# Patient Record
Sex: FEMALE | Race: WHITE | NOT HISPANIC OR LATINO | Employment: UNEMPLOYED | URBAN - METROPOLITAN AREA
[De-identification: names, ages, dates, MRNs, and addresses within clinical notes are randomized per-mention and may not be internally consistent; named-entity substitution may affect disease eponyms.]

---

## 2018-01-21 ENCOUNTER — HOSPITAL ENCOUNTER (EMERGENCY)
Facility: HOSPITAL | Age: 48
Discharge: HOME | End: 2018-01-22
Attending: EMERGENCY MEDICINE | Admitting: EMERGENCY MEDICINE

## 2018-01-21 DIAGNOSIS — R06.4 ACUTE HYPERVENTILATION: Primary | ICD-10-CM

## 2018-01-21 DIAGNOSIS — F41.8 SITUATIONAL ANXIETY: ICD-10-CM

## 2018-01-21 LAB
ALBUMIN SERPL-MCNC: 4.6 G/DL (ref 3.2–4.8)
ALBUMIN/GLOB SERPL: 1.6 G/DL (ref 1.5–2.5)
ALP SERPL-CCNC: 66 U/L (ref 25–100)
ALT SERPL W P-5'-P-CCNC: 20 U/L (ref 7–40)
ANION GAP SERPL CALCULATED.3IONS-SCNC: 9 MMOL/L (ref 3–11)
AST SERPL-CCNC: 30 U/L (ref 0–33)
BASOPHILS # BLD AUTO: 0.02 10*3/MM3 (ref 0–0.2)
BASOPHILS NFR BLD AUTO: 0.3 % (ref 0–1)
BILIRUB SERPL-MCNC: 0.4 MG/DL (ref 0.3–1.2)
BUN BLD-MCNC: 17 MG/DL (ref 9–23)
BUN/CREAT SERPL: 34 (ref 7–25)
CALCIUM SPEC-SCNC: 9.1 MG/DL (ref 8.7–10.4)
CHLORIDE SERPL-SCNC: 103 MMOL/L (ref 99–109)
CO2 SERPL-SCNC: 24 MMOL/L (ref 20–31)
CREAT BLD-MCNC: 0.5 MG/DL (ref 0.6–1.3)
DEPRECATED RDW RBC AUTO: 46.1 FL (ref 37–54)
EOSINOPHIL # BLD AUTO: 0.13 10*3/MM3 (ref 0–0.3)
EOSINOPHIL NFR BLD AUTO: 1.7 % (ref 0–3)
ERYTHROCYTE [DISTWIDTH] IN BLOOD BY AUTOMATED COUNT: 13.1 % (ref 11.3–14.5)
ETHANOL BLD-MCNC: 252 MG/DL (ref 0–10)
GFR SERPL CREATININE-BSD FRML MDRD: 133 ML/MIN/1.73
GLOBULIN UR ELPH-MCNC: 2.8 GM/DL
GLUCOSE BLD-MCNC: 102 MG/DL (ref 70–100)
HCT VFR BLD AUTO: 41.3 % (ref 34.5–44)
HGB BLD-MCNC: 14.5 G/DL (ref 11.5–15.5)
IMM GRANULOCYTES # BLD: 0.01 10*3/MM3 (ref 0–0.03)
IMM GRANULOCYTES NFR BLD: 0.1 % (ref 0–0.6)
LYMPHOCYTES # BLD AUTO: 3.55 10*3/MM3 (ref 0.6–4.8)
LYMPHOCYTES NFR BLD AUTO: 45.8 % (ref 24–44)
MCH RBC QN AUTO: 34.2 PG (ref 27–31)
MCHC RBC AUTO-ENTMCNC: 35.1 G/DL (ref 32–36)
MCV RBC AUTO: 97.4 FL (ref 80–99)
MONOCYTES # BLD AUTO: 0.62 10*3/MM3 (ref 0–1)
MONOCYTES NFR BLD AUTO: 8 % (ref 0–12)
NEUTROPHILS # BLD AUTO: 3.42 10*3/MM3 (ref 1.5–8.3)
NEUTROPHILS NFR BLD AUTO: 44.1 % (ref 41–71)
PLATELET # BLD AUTO: 226 10*3/MM3 (ref 150–450)
PMV BLD AUTO: 10.3 FL (ref 6–12)
POTASSIUM BLD-SCNC: 3.6 MMOL/L (ref 3.5–5.5)
PROT SERPL-MCNC: 7.4 G/DL (ref 5.7–8.2)
RBC # BLD AUTO: 4.24 10*6/MM3 (ref 3.89–5.14)
SODIUM BLD-SCNC: 136 MMOL/L (ref 132–146)
TROPONIN I SERPL-MCNC: 0 NG/ML (ref 0–0.07)
WBC NRBC COR # BLD: 7.75 10*3/MM3 (ref 3.5–10.8)

## 2018-01-21 PROCEDURE — 99284 EMERGENCY DEPT VISIT MOD MDM: CPT

## 2018-01-21 PROCEDURE — 96374 THER/PROPH/DIAG INJ IV PUSH: CPT

## 2018-01-21 PROCEDURE — 80053 COMPREHEN METABOLIC PANEL: CPT | Performed by: NURSE PRACTITIONER

## 2018-01-21 PROCEDURE — 80307 DRUG TEST PRSMV CHEM ANLYZR: CPT | Performed by: NURSE PRACTITIONER

## 2018-01-21 PROCEDURE — 84484 ASSAY OF TROPONIN QUANT: CPT

## 2018-01-21 PROCEDURE — 96361 HYDRATE IV INFUSION ADD-ON: CPT

## 2018-01-21 PROCEDURE — 93005 ELECTROCARDIOGRAM TRACING: CPT | Performed by: NURSE PRACTITIONER

## 2018-01-21 PROCEDURE — 25010000002 LORAZEPAM PER 2 MG: Performed by: EMERGENCY MEDICINE

## 2018-01-21 PROCEDURE — 85025 COMPLETE CBC W/AUTO DIFF WBC: CPT | Performed by: NURSE PRACTITIONER

## 2018-01-21 RX ORDER — LORAZEPAM 2 MG/ML
1 INJECTION INTRAMUSCULAR ONCE
Status: COMPLETED | OUTPATIENT
Start: 2018-01-21 | End: 2018-01-21

## 2018-01-21 RX ORDER — SODIUM CHLORIDE 0.9 % (FLUSH) 0.9 %
10 SYRINGE (ML) INJECTION AS NEEDED
Status: DISCONTINUED | OUTPATIENT
Start: 2018-01-21 | End: 2018-01-22 | Stop reason: HOSPADM

## 2018-01-21 RX ADMIN — LORAZEPAM 1 MG: 2 INJECTION, SOLUTION INTRAMUSCULAR; INTRAVENOUS at 20:59

## 2018-01-21 RX ADMIN — SODIUM CHLORIDE 1000 ML: 9 INJECTION, SOLUTION INTRAVENOUS at 20:59

## 2018-01-22 VITALS
WEIGHT: 130 LBS | HEIGHT: 64 IN | SYSTOLIC BLOOD PRESSURE: 96 MMHG | TEMPERATURE: 98 F | BODY MASS INDEX: 22.2 KG/M2 | DIASTOLIC BLOOD PRESSURE: 62 MMHG | RESPIRATION RATE: 18 BRPM | OXYGEN SATURATION: 97 % | HEART RATE: 83 BPM

## 2018-01-22 PROCEDURE — 96361 HYDRATE IV INFUSION ADD-ON: CPT

## 2018-01-22 RX ADMIN — SODIUM CHLORIDE 1000 ML: 9 INJECTION, SOLUTION INTRAVENOUS at 00:43

## 2018-01-22 NOTE — DISCHARGE INSTRUCTIONS
Follow up with [facility]. Phone number: [phone].    Immediately return to the emergency department for any new or worsening symptoms.     Follow up with one of the physician centers below to setup primary care.    [facility], [phone], [address], [address], [place], [address]    [facility], [phone], [address] [facility], [address]    [facility], [phone], [address] [place], [address];     [facility], [phone], [address] [facility], [facility]      Follow up with one of the [facility] physician groups below to setup primary care. If you have trouble following up, please call Tamiko Farah, our transitional care nurse, at [phone].    (Dr. Rolle, Dr. Thomas, Dr. Burden, and Dr. Cordoba.)  [facility], Primary Care, [phone], [address], [place] [address]    [facility], Primary Care, [phone], [address] [facility], [facility] [place], [address] [facility]) [facility], Primary Care, [phone], [address], [address] [place], [address] [place]     (Counts include 234 beds at the [facility]) [facility], Primary Care, [phone], [address], [address] [place], [address]     [place] [facility], Primary Care, [phone], [address], [address] [place], [address]    [place] [facility], Primary Care, [phone], [address], [place]    [place], [address] [facility], Primary Care, [phone], [address] [facility], [address] [place], [address] [place] [address], Primary Care, [phone], [address] [facility], [address] [place], [address] [place]      ([facility]) [facility], Primary Care, [phone], [address], [address] [place], [address] [facility], Primary Care, [phone], [address], [address] [place], [address] [facility]) [facility], Primary Care, [phone], [address], [address], [place], [address] [place] [address], Primary Care, [phone], [address] [facility], [address] [place], [facility]

## 2018-01-22 NOTE — ED PROVIDER NOTES
Subjective   HPI Comments: Payal Pacheco is a 47 y.o.female with previous hx of panic attacks and anxiety who presents to the ED with c/o panic attack with onset just PTA. She reports that she was worried about her son who has overdosed 4 times in the past year. She notes that she is unsure of where he is and suddenly started to hyperventilate. She notes that was unable to perform her usual breathing treatments. Soon thereafter, she states that she lost consciousness. The pt also complains of a moment of suicidal ideations that she quickly dismissed but denies CP, or any other complaints a this time.    Patient is a 47 y.o. female presenting with general illness.   History provided by:  Patient and EMS personnel  Illness   Severity:  Moderate  Onset quality:  Sudden  Timing:  Constant  Progression:  Unchanged  Chronicity:  Recurrent  Context:  Panic attack  Associated symptoms: shortness of breath    Associated symptoms: no chest pain        Review of Systems   Respiratory: Positive for shortness of breath.    Cardiovascular: Negative for chest pain.   Psychiatric/Behavioral: Positive for suicidal ideas. The patient is nervous/anxious.    All other systems reviewed and are negative.      Past Medical History:   Diagnosis Date   • Anxiety disorder        Allergies   Allergen Reactions   • Codeine Nausea Only   • Latex Hives       Past Surgical History:   Procedure Laterality Date   • HYSTERECTOMY         History reviewed. No pertinent family history.    Social History     Social History   • Marital status: Single     Spouse name: N/A   • Number of children: N/A   • Years of education: N/A     Social History Main Topics   • Smoking status: Current Every Day Smoker   • Smokeless tobacco: None   • Alcohol use Yes   • Drug use: Defer   • Sexual activity: Defer     Other Topics Concern   • None     Social History Narrative   • None         Objective   Physical Exam   Constitutional: She is oriented to person, place, and  time. She appears well-developed and well-nourished. No distress.   Lucid. Good historian. Tearful not delusional. No hallucinations.    HENT:   Head: Normocephalic and atraumatic.   Nose: Nose normal.   Eyes: Conjunctivae are normal. No scleral icterus.   Neck: Normal range of motion. Neck supple.   Cardiovascular: Regular rhythm and normal heart sounds.  Tachycardia present.    No murmur heard.  Pulmonary/Chest: Effort normal and breath sounds normal. No respiratory distress.   Abdominal: Soft. Bowel sounds are normal. There is no tenderness.   Neurological: She is alert and oriented to person, place, and time.   Skin: Skin is warm and dry.   Nursing note and vitals reviewed.      Procedures         ED Course  ED Course   Value Comment By Time    The pt notes that she has consumed 4-5 shots of alcohol prior to arrival. Durga Cruz 01/21 2210   Ethanol: (!!) 252 (Reviewed) Dari Samaniego, APRN 01/21 2311    Patient is more rousable.  She is lucid and more verbose.  Blood presure and pulse ox are improved.  Will DC to the care of her boyfriend. Dari Samaniego, APRN 01/22 0123     Recent Results (from the past 24 hour(s))   Comprehensive Metabolic Panel    Collection Time: 01/21/18  8:58 PM   Result Value Ref Range    Glucose 102 (H) 70 - 100 mg/dL    BUN 17 9 - 23 mg/dL    Creatinine 0.50 (L) 0.60 - 1.30 mg/dL    Sodium 136 132 - 146 mmol/L    Potassium 3.6 3.5 - 5.5 mmol/L    Chloride 103 99 - 109 mmol/L    CO2 24.0 20.0 - 31.0 mmol/L    Calcium 9.1 8.7 - 10.4 mg/dL    Total Protein 7.4 5.7 - 8.2 g/dL    Albumin 4.60 3.20 - 4.80 g/dL    ALT (SGPT) 20 7 - 40 U/L    AST (SGOT) 30 0 - 33 U/L    Alkaline Phosphatase 66 25 - 100 U/L    Total Bilirubin 0.4 0.3 - 1.2 mg/dL    eGFR Non African Amer 133 >60 mL/min/1.73    Globulin 2.8 gm/dL    A/G Ratio 1.6 1.5 - 2.5 g/dL    BUN/Creatinine Ratio 34.0 (H) 7.0 - 25.0    Anion Gap 9.0 3.0 - 11.0 mmol/L   CBC Auto Differential    Collection Time: 01/21/18  8:58  "PM   Result Value Ref Range    WBC 7.75 3.50 - 10.80 10*3/mm3    RBC 4.24 3.89 - 5.14 10*6/mm3    Hemoglobin 14.5 11.5 - 15.5 g/dL    Hematocrit 41.3 34.5 - 44.0 %    MCV 97.4 80.0 - 99.0 fL    MCH 34.2 (H) 27.0 - 31.0 pg    MCHC 35.1 32.0 - 36.0 g/dL    RDW 13.1 11.3 - 14.5 %    RDW-SD 46.1 37.0 - 54.0 fl    MPV 10.3 6.0 - 12.0 fL    Platelets 226 150 - 450 10*3/mm3    Neutrophil % 44.1 41.0 - 71.0 %    Lymphocyte % 45.8 (H) 24.0 - 44.0 %    Monocyte % 8.0 0.0 - 12.0 %    Eosinophil % 1.7 0.0 - 3.0 %    Basophil % 0.3 0.0 - 1.0 %    Immature Grans % 0.1 0.0 - 0.6 %    Neutrophils, Absolute 3.42 1.50 - 8.30 10*3/mm3    Lymphocytes, Absolute 3.55 0.60 - 4.80 10*3/mm3    Monocytes, Absolute 0.62 0.00 - 1.00 10*3/mm3    Eosinophils, Absolute 0.13 0.00 - 0.30 10*3/mm3    Basophils, Absolute 0.02 0.00 - 0.20 10*3/mm3    Immature Grans, Absolute 0.01 0.00 - 0.03 10*3/mm3   POC Troponin, Rapid    Collection Time: 01/21/18  9:01 PM   Result Value Ref Range    Troponin I 0.00 0.00 - 0.07 ng/mL   Ethanol    Collection Time: 01/21/18 10:07 PM   Result Value Ref Range    Ethanol 252 (C) 0 - 10 mg/dL     Note: In addition to lab results from this visit, the labs listed above may include labs taken at another facility or during a different encounter within the last 24 hours. Please correlate lab times with ED admission and discharge times for further clarification of the services performed during this visit.    No orders to display     Vitals:    01/21/18 2004 01/21/18 2300 01/22/18 0000 01/22/18 0100   BP: 167/84 107/69 101/58 91/52   Pulse: (!) 124 76 74 83   Resp: 24      Temp: 98 °F (36.7 °C)      TempSrc: Oral      SpO2: 94% 97% 97% 91%   Weight: 59 kg (130 lb)      Height: 162.6 cm (64\")        Medications   sodium chloride 0.9 % flush 10 mL (not administered)   sodium chloride 0.9 % bolus 1,000 mL (1,000 mL Intravenous New Bag 1/22/18 0043)   sodium chloride 0.9 % bolus 1,000 mL (0 mL Intravenous Stopped 1/21/18 5613) "   LORazepam (ATIVAN) injection 1 mg (1 mg Intravenous Given 1/21/18 2059)     ECG/EMG Results (last 24 hours)     ** No results found for the last 24 hours. **                        [facility]    Final diagnoses:   Acute hyperventilation   Situational anxiety       Documentation assistance provided by daljit Cruz.  Information recorded by the scribe was done at my direction and has been verified and validated by me.     Durga Cruz  01/21/18 2031       EUGENE Matamoros  01/22/18 0121

## 2018-03-21 ENCOUNTER — HOSPITAL ENCOUNTER (EMERGENCY)
Facility: HOSPITAL | Age: 48
Discharge: HOME | End: 2018-03-22
Attending: EMERGENCY MEDICINE | Admitting: EMERGENCY MEDICINE

## 2018-03-21 ENCOUNTER — APPOINTMENT (OUTPATIENT)
Dept: CT IMAGING | Facility: HOSPITAL | Age: 48
End: 2018-03-21

## 2018-03-21 VITALS
RESPIRATION RATE: 20 BRPM | DIASTOLIC BLOOD PRESSURE: 59 MMHG | HEIGHT: 64 IN | BODY MASS INDEX: 25.95 KG/M2 | WEIGHT: 152 LBS | HEART RATE: 91 BPM | TEMPERATURE: 98.5 F | OXYGEN SATURATION: 93 % | SYSTOLIC BLOOD PRESSURE: 107 MMHG

## 2018-03-21 DIAGNOSIS — J21.9 ACUTE BRONCHITIS AND BRONCHIOLITIS: Primary | ICD-10-CM

## 2018-03-21 DIAGNOSIS — R07.81 PLEURODYNIA: ICD-10-CM

## 2018-03-21 DIAGNOSIS — J20.9 ACUTE BRONCHITIS AND BRONCHIOLITIS: Primary | ICD-10-CM

## 2018-03-21 DIAGNOSIS — J44.1 COPD WITH ACUTE EXACERBATION (HCC): ICD-10-CM

## 2018-03-21 DIAGNOSIS — K20.90 ESOPHAGITIS: ICD-10-CM

## 2018-03-21 LAB
ALBUMIN SERPL-MCNC: 4.3 G/DL (ref 3.2–4.8)
ALBUMIN/GLOB SERPL: 1.3 G/DL (ref 1.5–2.5)
ALP SERPL-CCNC: 76 U/L (ref 25–100)
ALT SERPL W P-5'-P-CCNC: 30 U/L (ref 7–40)
AMPHET+METHAMPHET UR QL: NEGATIVE
AMPHETAMINES UR QL: NEGATIVE
ANION GAP SERPL CALCULATED.3IONS-SCNC: 8 MMOL/L (ref 3–11)
AST SERPL-CCNC: 42 U/L (ref 0–33)
B-HCG UR QL: NEGATIVE
BARBITURATES UR QL SCN: NEGATIVE
BASOPHILS # BLD AUTO: 0.02 10*3/MM3 (ref 0–0.2)
BASOPHILS NFR BLD AUTO: 0.3 % (ref 0–1)
BENZODIAZ UR QL SCN: NEGATIVE
BILIRUB SERPL-MCNC: 0.4 MG/DL (ref 0.3–1.2)
BILIRUB UR QL STRIP: NEGATIVE
BUN BLD-MCNC: 16 MG/DL (ref 9–23)
BUN/CREAT SERPL: 22.9 (ref 7–25)
BUPRENORPHINE SERPL-MCNC: NEGATIVE NG/ML
CALCIUM SPEC-SCNC: 8.7 MG/DL (ref 8.7–10.4)
CANNABINOIDS SERPL QL: NEGATIVE
CHLORIDE SERPL-SCNC: 110 MMOL/L (ref 99–109)
CLARITY UR: CLEAR
CO2 SERPL-SCNC: 18 MMOL/L (ref 20–31)
COCAINE UR QL: NEGATIVE
COLOR UR: YELLOW
CREAT BLD-MCNC: 0.7 MG/DL (ref 0.6–1.3)
D-LACTATE SERPL-SCNC: 1.7 MMOL/L (ref 0.5–2)
DEPRECATED RDW RBC AUTO: 54 FL (ref 37–54)
EOSINOPHIL # BLD AUTO: 0.18 10*3/MM3 (ref 0–0.3)
EOSINOPHIL NFR BLD AUTO: 2.5 % (ref 0–3)
ERYTHROCYTE [DISTWIDTH] IN BLOOD BY AUTOMATED COUNT: 14.5 % (ref 11.3–14.5)
FLUAV AG NPH QL: NEGATIVE
FLUBV AG NPH QL IA: NEGATIVE
GFR SERPL CREATININE-BSD FRML MDRD: 90 ML/MIN/1.73
GLOBULIN UR ELPH-MCNC: 3.2 GM/DL
GLUCOSE BLD-MCNC: 111 MG/DL (ref 70–100)
GLUCOSE UR STRIP-MCNC: NEGATIVE MG/DL
HCT VFR BLD AUTO: 39.8 % (ref 34.5–44)
HGB BLD-MCNC: 13.4 G/DL (ref 11.5–15.5)
HGB UR QL STRIP.AUTO: NEGATIVE
HOLD SPECIMEN: NORMAL
HOLD SPECIMEN: NORMAL
IMM GRANULOCYTES # BLD: 0.03 10*3/MM3 (ref 0–0.03)
IMM GRANULOCYTES NFR BLD: 0.4 % (ref 0–0.6)
INTERNAL NEGATIVE CONTROL: NORMAL
INTERNAL POSITIVE CONTROL: NORMAL
KETONES UR QL STRIP: NEGATIVE
LEUKOCYTE ESTERASE UR QL STRIP.AUTO: NEGATIVE
LYMPHOCYTES # BLD AUTO: 2.98 10*3/MM3 (ref 0.6–4.8)
LYMPHOCYTES NFR BLD AUTO: 40.8 % (ref 24–44)
Lab: NORMAL
MCH RBC QN AUTO: 34.5 PG (ref 27–31)
MCHC RBC AUTO-ENTMCNC: 33.7 G/DL (ref 32–36)
MCV RBC AUTO: 102.6 FL (ref 80–99)
METHADONE UR QL SCN: NEGATIVE
MONOCYTES # BLD AUTO: 0.72 10*3/MM3 (ref 0–1)
MONOCYTES NFR BLD AUTO: 9.9 % (ref 0–12)
NEUTROPHILS # BLD AUTO: 3.37 10*3/MM3 (ref 1.5–8.3)
NEUTROPHILS NFR BLD AUTO: 46.1 % (ref 41–71)
NITRITE UR QL STRIP: NEGATIVE
OPIATES UR QL: NEGATIVE
OXYCODONE UR QL SCN: NEGATIVE
PCP UR QL SCN: NEGATIVE
PH UR STRIP.AUTO: <=5 [PH] (ref 5–8)
PLATELET # BLD AUTO: 264 10*3/MM3 (ref 150–450)
PMV BLD AUTO: 9.8 FL (ref 6–12)
POTASSIUM BLD-SCNC: 3.7 MMOL/L (ref 3.5–5.5)
PROCALCITONIN SERPL-MCNC: <0.05 NG/ML
PROPOXYPH UR QL: NEGATIVE
PROT SERPL-MCNC: 7.5 G/DL (ref 5.7–8.2)
PROT UR QL STRIP: NEGATIVE
RBC # BLD AUTO: 3.88 10*6/MM3 (ref 3.89–5.14)
SODIUM BLD-SCNC: 136 MMOL/L (ref 132–146)
SP GR UR STRIP: 1.01 (ref 1–1.03)
TRICYCLICS UR QL SCN: POSITIVE
UROBILINOGEN UR QL STRIP: NORMAL
WBC NRBC COR # BLD: 7.3 10*3/MM3 (ref 3.5–10.8)
WHOLE BLOOD HOLD SPECIMEN: NORMAL
WHOLE BLOOD HOLD SPECIMEN: NORMAL

## 2018-03-21 PROCEDURE — 99284 EMERGENCY DEPT VISIT MOD MDM: CPT

## 2018-03-21 PROCEDURE — 87040 BLOOD CULTURE FOR BACTERIA: CPT | Performed by: EMERGENCY MEDICINE

## 2018-03-21 PROCEDURE — 96375 TX/PRO/DX INJ NEW DRUG ADDON: CPT

## 2018-03-21 PROCEDURE — 80306 DRUG TEST PRSMV INSTRMNT: CPT | Performed by: EMERGENCY MEDICINE

## 2018-03-21 PROCEDURE — 96374 THER/PROPH/DIAG INJ IV PUSH: CPT

## 2018-03-21 PROCEDURE — 25010000002 METHYLPREDNISOLONE PER 125 MG: Performed by: EMERGENCY MEDICINE

## 2018-03-21 PROCEDURE — 96376 TX/PRO/DX INJ SAME DRUG ADON: CPT

## 2018-03-21 PROCEDURE — 87804 INFLUENZA ASSAY W/OPTIC: CPT | Performed by: EMERGENCY MEDICINE

## 2018-03-21 PROCEDURE — 25010000002 HYDROMORPHONE PER 4 MG: Performed by: EMERGENCY MEDICINE

## 2018-03-21 PROCEDURE — 96361 HYDRATE IV INFUSION ADD-ON: CPT

## 2018-03-21 PROCEDURE — 71275 CT ANGIOGRAPHY CHEST: CPT

## 2018-03-21 PROCEDURE — 85025 COMPLETE CBC W/AUTO DIFF WBC: CPT | Performed by: EMERGENCY MEDICINE

## 2018-03-21 PROCEDURE — 94760 N-INVAS EAR/PLS OXIMETRY 1: CPT

## 2018-03-21 PROCEDURE — 36415 COLL VENOUS BLD VENIPUNCTURE: CPT

## 2018-03-21 PROCEDURE — 84145 PROCALCITONIN (PCT): CPT | Performed by: EMERGENCY MEDICINE

## 2018-03-21 PROCEDURE — 81003 URINALYSIS AUTO W/O SCOPE: CPT | Performed by: EMERGENCY MEDICINE

## 2018-03-21 PROCEDURE — 94640 AIRWAY INHALATION TREATMENT: CPT

## 2018-03-21 PROCEDURE — 93005 ELECTROCARDIOGRAM TRACING: CPT | Performed by: PHYSICIAN ASSISTANT

## 2018-03-21 PROCEDURE — 94799 UNLISTED PULMONARY SVC/PX: CPT

## 2018-03-21 PROCEDURE — 80053 COMPREHEN METABOLIC PANEL: CPT | Performed by: EMERGENCY MEDICINE

## 2018-03-21 PROCEDURE — 25010000002 ONDANSETRON PER 1 MG: Performed by: EMERGENCY MEDICINE

## 2018-03-21 PROCEDURE — 83605 ASSAY OF LACTIC ACID: CPT | Performed by: EMERGENCY MEDICINE

## 2018-03-21 PROCEDURE — 0 IOPAMIDOL PER 1 ML: Performed by: EMERGENCY MEDICINE

## 2018-03-21 RX ORDER — HYDROMORPHONE HYDROCHLORIDE 1 MG/ML
0.5 INJECTION, SOLUTION INTRAMUSCULAR; INTRAVENOUS; SUBCUTANEOUS ONCE
Status: COMPLETED | OUTPATIENT
Start: 2018-03-21 | End: 2018-03-21

## 2018-03-21 RX ORDER — IPRATROPIUM BROMIDE AND ALBUTEROL SULFATE 2.5; .5 MG/3ML; MG/3ML
3 SOLUTION RESPIRATORY (INHALATION) ONCE
Status: COMPLETED | OUTPATIENT
Start: 2018-03-21 | End: 2018-03-21

## 2018-03-21 RX ORDER — ONDANSETRON 2 MG/ML
4 INJECTION INTRAMUSCULAR; INTRAVENOUS ONCE
Status: COMPLETED | OUTPATIENT
Start: 2018-03-21 | End: 2018-03-21

## 2018-03-21 RX ORDER — SODIUM CHLORIDE 0.9 % (FLUSH) 0.9 %
10 SYRINGE (ML) INJECTION AS NEEDED
Status: DISCONTINUED | OUTPATIENT
Start: 2018-03-21 | End: 2018-03-22 | Stop reason: HOSPADM

## 2018-03-21 RX ORDER — SODIUM CHLORIDE 9 MG/ML
125 INJECTION, SOLUTION INTRAVENOUS CONTINUOUS
Status: DISCONTINUED | OUTPATIENT
Start: 2018-03-21 | End: 2018-03-22 | Stop reason: HOSPADM

## 2018-03-21 RX ORDER — METHYLPREDNISOLONE SODIUM SUCCINATE 125 MG/2ML
125 INJECTION, POWDER, LYOPHILIZED, FOR SOLUTION INTRAMUSCULAR; INTRAVENOUS ONCE
Status: COMPLETED | OUTPATIENT
Start: 2018-03-21 | End: 2018-03-21

## 2018-03-21 RX ADMIN — SODIUM CHLORIDE 125 ML/HR: 9 INJECTION, SOLUTION INTRAVENOUS at 21:34

## 2018-03-21 RX ADMIN — SODIUM CHLORIDE 1000 ML: 9 INJECTION, SOLUTION INTRAVENOUS at 20:24

## 2018-03-21 RX ADMIN — ONDANSETRON 4 MG: 2 INJECTION INTRAMUSCULAR; INTRAVENOUS at 20:24

## 2018-03-21 RX ADMIN — HYDROMORPHONE HYDROCHLORIDE 0.5 MG: 10 INJECTION INTRAMUSCULAR; INTRAVENOUS; SUBCUTANEOUS at 20:29

## 2018-03-21 RX ADMIN — METHYLPREDNISOLONE SODIUM SUCCINATE 125 MG: 125 INJECTION, POWDER, FOR SOLUTION INTRAMUSCULAR; INTRAVENOUS at 20:31

## 2018-03-21 RX ADMIN — IOPAMIDOL 80 ML: 755 INJECTION, SOLUTION INTRAVENOUS at 22:50

## 2018-03-21 RX ADMIN — IPRATROPIUM BROMIDE AND ALBUTEROL SULFATE 3 ML: .5; 3 SOLUTION RESPIRATORY (INHALATION) at 20:35

## 2018-03-21 NOTE — ED PROVIDER NOTES
Subjective   Ms. Payal Pacheco is a 48 y.o. female who presents to the ED with URI sx. For the past 10 days she has been experiencing constant SoA, dry coughs, and pain in her sides and flanks. She has had 5 episodes of severe coughing spells with pain in her sides during this time. Additionally, she complains of 3 days ago fevers, chill, diaphoresis, and sore throat. She was recently diagnosed with the flu a couple weeks ago, but was out of the window for Tamiflu. She denies any other acute sx at this time.         History provided by:  Patient  URI   Presenting symptoms: cough, fever and sore throat    Severity:  Moderate  Onset quality:  Gradual  Duration:  10 days  Timing:  Constant  Progression:  Worsening  Chronicity:  New  Relieved by:  None tried  Worsened by:  Nothing  Ineffective treatments:  None tried      Review of Systems   Constitutional: Positive for chills, diaphoresis and fever.   HENT: Positive for sore throat.    Respiratory: Positive for cough and shortness of breath.    Genitourinary: Positive for flank pain.   Musculoskeletal: Positive for back pain.   All other systems reviewed and are negative.      Past Medical History:   Diagnosis Date   • Anxiety disorder    7:33 PM  Old records reviewed:  Seen in Jan 2018 for hyperventilation and anxiety. She has a hx of panic attacks.   -EIR    Allergies   Allergen Reactions   • Codeine Nausea Only   • Latex Hives       Past Surgical History:   Procedure Laterality Date   • HYSTERECTOMY         No family history on file.    Social History     Social History   • Marital status: Single     Social History Main Topics   • Smoking status: Current Every Day Smoker   • Alcohol use Yes   • Drug use: Unknown   • Sexual activity: Defer     Other Topics Concern   • Not on file         Objective   Physical Exam   Constitutional: She is oriented to person, place, and time. She appears well-developed and well-nourished. No distress.   No acute distressed but  appeared in a paroxsymal of cough and could not speak for several min and appeared in pain. When this ended she could talk.   HENT:   Head: Normocephalic and atraumatic.   Nose: Nose normal.   Mouth/Throat: Mucous membranes are dry. Posterior oropharyngeal erythema present. No oropharyngeal exudate.   Eyes: Conjunctivae are normal. No scleral icterus.   Neck: Normal range of motion. Neck supple.   Forced glottic expirations   Cardiovascular: Regular rhythm, normal heart sounds and intact distal pulses.  Tachycardia present.  Exam reveals no gallop and no friction rub.    No murmur heard.  Pulmonary/Chest: Effort normal. No respiratory distress. She has wheezes (few scattered). She exhibits tenderness (Chest TTP on the left and right rib area w/o crepitus or rash).   Abdominal: Soft. Bowel sounds are normal. There is no tenderness.   Musculoskeletal: Normal range of motion.   Multiple tattoos, no synovitis, rash, or edema.   Neurological: She is alert and oriented to person, place, and time.   Nonfocal   Skin: Skin is warm and dry. She is not diaphoretic.   Psychiatric: She has a normal mood and affect. Her behavior is normal.   Nursing note and vitals reviewed.      Procedures         ED Course  ED Course     Recent Results (from the past 24 hour(s))   Comprehensive Metabolic Panel    Collection Time: 03/21/18  7:40 PM   Result Value Ref Range    Glucose 111 (H) 70 - 100 mg/dL    BUN 16 9 - 23 mg/dL    Creatinine 0.70 0.60 - 1.30 mg/dL    Sodium 136 132 - 146 mmol/L    Potassium 3.7 3.5 - 5.5 mmol/L    Chloride 110 (H) 99 - 109 mmol/L    CO2 18.0 (L) 20.0 - 31.0 mmol/L    Calcium 8.7 8.7 - 10.4 mg/dL    Total Protein 7.5 5.7 - 8.2 g/dL    Albumin 4.30 3.20 - 4.80 g/dL    ALT (SGPT) 30 7 - 40 U/L    AST (SGOT) 42 (H) 0 - 33 U/L    Alkaline Phosphatase 76 25 - 100 U/L    Total Bilirubin 0.4 0.3 - 1.2 mg/dL    eGFR Non African Amer 90 >60 mL/min/1.73    Globulin 3.2 gm/dL    A/G Ratio 1.3 (L) 1.5 - 2.5 g/dL     BUN/Creatinine Ratio 22.9 7.0 - 25.0    Anion Gap 8.0 3.0 - 11.0 mmol/L   Lactic Acid, Plasma    Collection Time: 03/21/18  7:40 PM   Result Value Ref Range    Lactate 1.7 0.5 - 2.0 mmol/L   CBC Auto Differential    Collection Time: 03/21/18  7:40 PM   Result Value Ref Range    WBC 7.30 3.50 - 10.80 10*3/mm3    RBC 3.88 (L) 3.89 - 5.14 10*6/mm3    Hemoglobin 13.4 11.5 - 15.5 g/dL    Hematocrit 39.8 34.5 - 44.0 %    .6 (H) 80.0 - 99.0 fL    MCH 34.5 (H) 27.0 - 31.0 pg    MCHC 33.7 32.0 - 36.0 g/dL    RDW 14.5 11.3 - 14.5 %    RDW-SD 54.0 37.0 - 54.0 fl    MPV 9.8 6.0 - 12.0 fL    Platelets 264 150 - 450 10*3/mm3    Neutrophil % 46.1 41.0 - 71.0 %    Lymphocyte % 40.8 24.0 - 44.0 %    Monocyte % 9.9 0.0 - 12.0 %    Eosinophil % 2.5 0.0 - 3.0 %    Basophil % 0.3 0.0 - 1.0 %    Immature Grans % 0.4 0.0 - 0.6 %    Neutrophils, Absolute 3.37 1.50 - 8.30 10*3/mm3    Lymphocytes, Absolute 2.98 0.60 - 4.80 10*3/mm3    Monocytes, Absolute 0.72 0.00 - 1.00 10*3/mm3    Eosinophils, Absolute 0.18 0.00 - 0.30 10*3/mm3    Basophils, Absolute 0.02 0.00 - 0.20 10*3/mm3    Immature Grans, Absolute 0.03 0.00 - 0.03 10*3/mm3   Light Blue Top    Collection Time: 03/21/18  7:40 PM   Result Value Ref Range    Extra Tube hold for add-on    Green Top (Gel)    Collection Time: 03/21/18  7:40 PM   Result Value Ref Range    Extra Tube Hold for add-ons.    Lavender Top    Collection Time: 03/21/18  7:40 PM   Result Value Ref Range    Extra Tube hold for add-on    Gold Top - SST    Collection Time: 03/21/18  7:40 PM   Result Value Ref Range    Extra Tube Hold for add-ons.    Procalcitonin    Collection Time: 03/21/18  7:40 PM   Result Value Ref Range    Procalcitonin <0.05 <=0.25 ng/mL   Urine Drug Screen - Urine, Clean Catch    Collection Time: 03/21/18  8:34 PM   Result Value Ref Range    THC, Screen, Urine Negative Negative    Phencyclidine (PCP), Urine Negative Negative    Cocaine Screen, Urine Negative Negative    Methamphetamine,  Urine Negative Negative    Opiate Screen Negative Negative    Amphetamine Screen, Urine Negative Negative    Benzodiazepine Screen, Urine Negative Negative    Tricyclic Antidepressants Screen Positive (A) Negative    Methadone Screen, Urine Negative Negative    Barbiturates Screen, Urine Negative Negative    Oxycodone Screen, Urine Negative Negative    Propoxyphene Screen Negative Negative    Buprenorphine, Screen, Urine Negative Negative   Urinalysis With / Microscopic If Indicated - Urine, Clean Catch    Collection Time: 03/21/18  8:34 PM   Result Value Ref Range    Color, UA Yellow Yellow, Straw    Appearance, UA Clear Clear    pH, UA <=5.0 5.0 - 8.0    Specific Gravity, UA 1.008 1.001 - 1.030    Glucose, UA Negative Negative    Ketones, UA Negative Negative    Bilirubin, UA Negative Negative    Blood, UA Negative Negative    Protein, UA Negative Negative    Leuk Esterase, UA Negative Negative    Nitrite, UA Negative Negative    Urobilinogen, UA 0.2 E.U./dL 0.2 - 1.0 E.U./dL   POCT Pregnancy, urine    Collection Time: 03/21/18  8:46 PM   Result Value Ref Range    HCG, Urine, QL Negative Negative    Lot Number [id number]     Internal Positive Control Presumptive Positive     Internal Negative Control Presumptive Negative    Influenza Antigen, Rapid - Swab, Nasopharynx    Collection Time: 03/21/18  9:07 PM   Result Value Ref Range    Influenza A Ag, EIA Negative Negative    Influenza B Ag, EIA Negative Negative     Note: In addition to lab results from this visit, the labs listed above may include labs taken at another facility or during a different encounter within the last 24 hours. Please correlate lab times with ED admission and discharge times for further clarification of the services performed during this visit.    CT Angiogram Chest With Contrast   Final Result     No evidence of acute pulmonary thromboembolism.        Unremarkable lungs and mediastinum without consolidation or effusion.         Hiatal  hernia with findings suspicious for moderately severe    ESOPHAGITIS/reflux.  Endoscopy may be considered for further evaluation.      THIS DOCUMENT HAS BEEN ELECTRONICALLY SIGNED BY RJ RUTHERFORD MD        Vitals:    03/21/18 2300 03/21/18 2308 03/21/18 2310 03/21/18 2330   BP: 131/64 107/64  107/59   BP Location:       Patient Position:       Pulse:   93 91   Resp:       Temp:       TempSrc:       SpO2: 96%  95% 93%   Weight:       Height:         Medications   sodium chloride 0.9 % flush 10 mL (not administered)   sodium chloride 0.9 % infusion (0 mL/hr Intravenous Stopped 3/22/18 0023)   sodium chloride 0.9 % bolus 1,000 mL (0 mL Intravenous Stopped 3/21/18 2124)   methylPREDNISolone sodium succinate (SOLU-Medrol) injection 125 mg (125 mg Intravenous Given 3/21/18 2031)   ondansetron (ZOFRAN) injection 4 mg (4 mg Intravenous Given 3/21/18 2024)   HYDROmorphone (DILAUDID) injection 0.5 mg (0.5 mg Intravenous Given 3/21/18 2029)   ipratropium-albuterol (DUO-NEB) nebulizer solution 3 mL (3 mL Nebulization Given 3/21/18 2035)   iopamidol (ISOVUE-370) 76 % injection 100 mL (80 mL Intravenous Given 3/21/18 2250)   HYDROmorphone (DILAUDID) injection 0.5 mg (0.5 mg Intravenous Given 3/22/18 0023)     ECG/EMG Results (last 24 hours)     Procedure Component Value Units Date/Time    ECG 12 Lead [[id number]] Collected:  03/21/18 1927     Updated:  03/21/18 1928                          MDM  Number of Diagnoses or Management Options  Acute bronchitis and bronchiolitis:   COPD with acute exacerbation:   Esophagitis:   Pleurodynia:   Diagnosis management comments:       I reviewed all available studies at the bedside with the patient and her family.  They are reassuring.  His CTA was performed due to the patient's extreme chest pain and tachycardia thankfully was bland that showing no evidence of pulmonary embolus, pneumonia, or aortic dissection.  Suspect she has some underlying COPD with bronchitis.  I've treated her with  nebulizers and steroids and some pain medicine she feels much improved on exam is not wheezing.    She has a nebulizer machine at home and I wrote her for solution for her nebulizer, a short course of steroids and antibiotics and something for the cough and I've encouraged follow-up with her primary care doctor later this week for recheck.  She'll return to the ER if worsen anyway.    Her E Kaspar is bland.  She has known esophagitis and is scheduled for an EGD next months however continue her current therapy for it and this is noted on her CT scan today.    All are agreeable with       Amount and/or Complexity of Data Reviewed  Clinical lab tests: reviewed  Tests in the medicine section of CPT®: reviewed        Final diagnoses:   Acute bronchitis and bronchiolitis   COPD with acute exacerbation   Pleurodynia   Esophagitis   EMR Dragon/Transcription disclaimer:  Much of this encounter note is an electronic transcription/translation of spoken language to printed text. The electronic translation of spoken language may permit erroneous, or at times, nonsensical words or phrases to be inadvertently transcribed; Although I have reviewed the note for such errors, some may still exist.      Documentation assistance provided by daljit THURMAN.  Information recorded by the daljit was done at my direction and has been verified and validated by me.     Ze Thurman  03/21/18 2025       Rogerio Anderson MD  03/22/18 0120

## 2018-03-22 PROCEDURE — 96376 TX/PRO/DX INJ SAME DRUG ADON: CPT

## 2018-03-22 PROCEDURE — 25010000002 HYDROMORPHONE PER 4 MG: Performed by: EMERGENCY MEDICINE

## 2018-03-22 RX ORDER — PREDNISONE 20 MG/1
20 TABLET ORAL 2 TIMES DAILY
Qty: 6 TABLET | Refills: 0 | Status: SHIPPED | OUTPATIENT
Start: 2018-03-22 | End: 2018-03-25

## 2018-03-22 RX ORDER — ALBUTEROL SULFATE 1.25 MG/3ML
1 SOLUTION RESPIRATORY (INHALATION) EVERY 6 HOURS PRN
Qty: 120 VIAL | Refills: 0 | Status: SHIPPED | OUTPATIENT
Start: 2018-03-22 | End: 2018-04-19

## 2018-03-22 RX ORDER — HYDROMORPHONE HYDROCHLORIDE 1 MG/ML
0.5 INJECTION, SOLUTION INTRAMUSCULAR; INTRAVENOUS; SUBCUTANEOUS ONCE
Status: COMPLETED | OUTPATIENT
Start: 2018-03-22 | End: 2018-03-22

## 2018-03-22 RX ORDER — AZITHROMYCIN 250 MG/1
250 TABLET, FILM COATED ORAL DAILY
Qty: 6 TABLET | Refills: 0 | Status: SHIPPED | OUTPATIENT
Start: 2018-03-22 | End: 2018-04-19

## 2018-03-22 RX ADMIN — HYDROMORPHONE HYDROCHLORIDE 0.5 MG: 10 INJECTION INTRAMUSCULAR; INTRAVENOUS; SUBCUTANEOUS at 00:23

## 2018-03-26 LAB
BACTERIA SPEC AEROBE CULT: NORMAL
BACTERIA SPEC AEROBE CULT: NORMAL

## 2024-03-01 ENCOUNTER — OFFICE VISIT (OUTPATIENT)
Dept: OBSTETRICS AND GYNECOLOGY | Facility: CLINIC | Age: 54
End: 2024-03-01
Payer: MEDICAID

## 2024-03-01 VITALS
HEIGHT: 64 IN | BODY MASS INDEX: 26.63 KG/M2 | DIASTOLIC BLOOD PRESSURE: 70 MMHG | WEIGHT: 156 LBS | SYSTOLIC BLOOD PRESSURE: 108 MMHG

## 2024-03-01 DIAGNOSIS — Z79.890 HORMONE REPLACEMENT THERAPY (HRT): Primary | ICD-10-CM

## 2024-03-01 DIAGNOSIS — N95.1 MENOPAUSAL SYMPTOMS: ICD-10-CM

## 2024-03-01 RX ORDER — CONJUGATED ESTROGENS 0.62 MG/G
CREAM VAGINAL 2 TIMES WEEKLY
Qty: 30 G | Refills: 5 | Status: SHIPPED | OUTPATIENT
Start: 2024-03-04

## 2024-03-06 PROBLEM — Z79.890 HORMONE REPLACEMENT THERAPY (HRT): Status: ACTIVE | Noted: 2024-03-06

## 2024-03-06 PROBLEM — N95.1 MENOPAUSAL SYMPTOMS: Status: ACTIVE | Noted: 2024-03-06

## 2024-03-06 NOTE — PROGRESS NOTES
"GYN Office Visit    Subjective   Chief Complaint   Patient presents with    Menopause     Wants to discuss menopause symptoms      Payal Pacheco is a 54 y.o. year old  presenting to be seen for significant menopausal symptoms.    She reports significant and distressing menopausal symptoms for the past 2 months.  She has experienced 23 pound weight gain, mood swings, anxiety, hot flashes, night sweats and difficulty sleeping.  Hysterectomy with ovarian preservation in .  She reports vaginal dryness and pain with intercourse.  Low libido as well.    OB Hx:  x 3, 2 living children  Pap smear: , no history of cervical dysplasia  Mammogram: Never    Past Medical History:   Diagnosis Date    Anxiety disorder     Asthma     Migraine     Preeclampsia     Trauma        Past Surgical History:   Procedure Laterality Date    HYSTERECTOMY         History reviewed. No pertinent family history.     Social History     Tobacco Use    Smoking status: Every Day   Vaping Use    Vaping status: Never Used   Substance Use Topics    Alcohol use: Yes    Drug use: Never       (Not in a hospital admission)      Codeine and Latex    Current Outpatient Medications on File Prior to Visit   Medication Sig Dispense Refill    ASHWAGANDHA PO Take  by mouth.      BLACK COHOSH EXTRACT PO Take  by mouth.       No current facility-administered medications on file prior to visit.       Social History    Tobacco Use      Smoking status: Every Day      Smokeless tobacco: Not on file         Objective   /70   Ht 162.6 cm (64\")   Wt 70.8 kg (156 lb)   BMI 26.78 kg/m²     Physical Exam:  General Appearance: alert, pleasant, appears stated age, interactive and cooperative         Medical Decision Making:    Assessment   Severe menopausal symptoms  Hormone replacement therapy     Plan    No orders of the defined types were placed in this encounter.      Medication Management: Climara patches 0.1 mg/day, vaginal estrogen cream twice " weekly    Procedures Performed: None    We reviewed her symptoms in detail today.  We discussed menopause in detail.  We discussed hormonal and non-hormonal treatment options.  After reviewing risks and benefits of both approaches, the patient opted for estrogen replacement.  Start Climara patches as above.  Start vaginal estrogen cream as well.  Rx and instructions reviewed in detail today.    RTC in 3 months for reassessment of symptoms.  She will need a mammogram ordered in the near future.    Jai Beauchamp MD  Obstetrics and Gynecology  [facility]

## 2024-04-12 ENCOUNTER — OFFICE VISIT (OUTPATIENT)
Dept: OBSTETRICS AND GYNECOLOGY | Facility: CLINIC | Age: 54
End: 2024-04-12
Payer: MEDICAID

## 2024-04-12 VITALS
DIASTOLIC BLOOD PRESSURE: 70 MMHG | WEIGHT: 154 LBS | BODY MASS INDEX: 26.29 KG/M2 | SYSTOLIC BLOOD PRESSURE: 110 MMHG | HEIGHT: 64 IN

## 2024-04-12 DIAGNOSIS — Z90.710 H/O TOTAL HYSTERECTOMY: ICD-10-CM

## 2024-04-12 DIAGNOSIS — Z79.890 HORMONE REPLACEMENT THERAPY (HRT): Primary | ICD-10-CM

## 2024-04-12 RX ORDER — ESTRADIOL 2 MG/1
2 TABLET ORAL DAILY
Qty: 30 TABLET | Refills: 11 | Status: SHIPPED | OUTPATIENT
Start: 2024-04-12 | End: 2024-04-18

## 2024-04-17 ENCOUNTER — TELEPHONE (OUTPATIENT)
Dept: OBSTETRICS AND GYNECOLOGY | Facility: CLINIC | Age: 54
End: 2024-04-17
Payer: MEDICAID

## 2024-04-17 NOTE — TELEPHONE ENCOUNTER
Patient called wanting to back on her patches, she does not want to continue the pills, she fills worse than she did when she was on patches.

## 2024-04-18 DIAGNOSIS — Z79.890 HORMONE REPLACEMENT THERAPY (HRT): Primary | ICD-10-CM

## 2024-04-26 PROBLEM — Z90.710 H/O TOTAL HYSTERECTOMY: Status: ACTIVE | Noted: 2024-04-26

## 2024-04-26 NOTE — PROGRESS NOTES
"GYN Office Visit    Subjective   Chief Complaint   Patient presents with    Follow-up     6 week follow up. Patient feels dose of patches needs to be increased     Payal Pacheco is a 54 y.o. year old  presenting to be seen for significant menopausal symptoms.    She reports reports compliance with patches and has some improvement, but would like to be on a higher dose if possible.  Hysterectomy with ovarian preservation in .     OB Hx:  x 3, 2 living children  Pap smear: , no history of cervical dysplasia  Mammogram: Never    Past Medical History:   Diagnosis Date    Anxiety disorder     Asthma     Migraine     Preeclampsia     Trauma        Past Surgical History:   Procedure Laterality Date    HYSTERECTOMY         History reviewed. No pertinent family history.     Social History     Tobacco Use    Smoking status: Every Day   Vaping Use    Vaping status: Never Used   Substance Use Topics    Alcohol use: Yes    Drug use: Never       (Not in a hospital admission)      Codeine and Latex    Current Outpatient Medications on File Prior to Visit   Medication Sig Dispense Refill    ASHWAGANDHA PO Take  by mouth.      BLACK COHOSH EXTRACT PO Take  by mouth.      Estrogens Conjugated (Premarin) 0.625 MG/GM vaginal cream Insert  into the vagina 2 (Two) Times a Week. (Patient not taking: Reported on 2024) 30 g 5     No current facility-administered medications on file prior to visit.       Social History    Tobacco Use      Smoking status: Every Day      Smokeless tobacco: Not on file         Objective   /70   Ht 162.6 cm (64.02\")   Wt 69.9 kg (154 lb)   BMI 26.42 kg/m²     Physical Exam:  General Appearance: alert, pleasant, appears stated age, interactive and cooperative         Medical Decision Making:    Assessment   Severe menopausal symptoms  Hormone replacement therapy     Plan    No orders of the defined types were placed in this encounter.      Medication Management: Stop patches, " start maximum dose oral estradiol    Procedures Performed: None    We reviewed her situation in detail today.  HRT changes as above.  Call with an update in the next few weeks.    RTC in 3 months for reassessment of symptoms.  She will need a mammogram ordered in the near future.    Jai Beauchamp MD  Obstetrics and Gynecology  [facility]

## 2024-05-15 ENCOUNTER — TELEPHONE (OUTPATIENT)
Dept: OBSTETRICS AND GYNECOLOGY | Facility: CLINIC | Age: 54
End: 2024-05-15
Payer: MEDICAID

## 2024-05-15 NOTE — TELEPHONE ENCOUNTER
Caller: Payal Pacheco     Relationship:SELF    Best call back number: [phone]    What is your medical concern? HORMONES    How long has this issue been going on? ON GOING    Is your provider already aware of this issue? YES    Have you been treated for this issue? YES - PT STATED  TOLD HER TO CALL FOR NEXT STEPS

## 2024-05-16 NOTE — TELEPHONE ENCOUNTER
Patient advised she took last patch x 3 days ago. They aren't staying on and is using more often. Is having a lot of hot flashes and night sweats. Patient advised pills didn't work at all either and is checking back in with you to see what other options she has, thanks.

## 2024-06-18 DIAGNOSIS — Z79.890 HORMONE REPLACEMENT THERAPY (HRT): Primary | ICD-10-CM

## 2024-06-18 DIAGNOSIS — Z79.890 HORMONE REPLACEMENT THERAPY (HRT): ICD-10-CM

## 2024-06-18 RX ORDER — ESTRADIOL 1 MG/G
1 GEL TOPICAL DAILY
Qty: 30 G | Refills: 11 | Status: SHIPPED | OUTPATIENT
Start: 2024-06-18 | End: 2024-06-18 | Stop reason: SDUPTHER

## 2024-06-18 RX ORDER — ESTRADIOL 1 MG/G
1 GEL TOPICAL DAILY
Qty: 30 G | Refills: 11 | Status: SHIPPED | OUTPATIENT
Start: 2024-06-18

## 2025-07-22 RX ORDER — ESTRADIOL 2 MG/1
2 TABLET ORAL DAILY
Qty: 270 TABLET | Refills: 0 | Status: SHIPPED | OUTPATIENT
Start: 2025-07-22